# Patient Record
Sex: FEMALE | Race: ASIAN | Employment: OTHER | URBAN - METROPOLITAN AREA
[De-identification: names, ages, dates, MRNs, and addresses within clinical notes are randomized per-mention and may not be internally consistent; named-entity substitution may affect disease eponyms.]

---
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
## 2022-01-10 ENCOUNTER — HOSPITAL ENCOUNTER (EMERGENCY)
Age: 22
Discharge: HOME | End: 2022-01-10
Attending: EMERGENCY MEDICINE
Payer: OTHER GOVERNMENT

## 2022-01-10 VITALS
RESPIRATION RATE: 19 BRPM | DIASTOLIC BLOOD PRESSURE: 79 MMHG | OXYGEN SATURATION: 99 % | TEMPERATURE: 98.2 F | HEART RATE: 92 BPM | SYSTOLIC BLOOD PRESSURE: 123 MMHG

## 2022-01-10 DIAGNOSIS — Z20.822 SUSPECTED COVID-19 VIRUS INFECTION: ICD-10-CM

## 2022-01-10 DIAGNOSIS — J06.9 ACUTE URI: Primary | ICD-10-CM

## 2022-01-10 LAB — SARS-COV-2, COV2: NORMAL

## 2022-01-10 PROCEDURE — 99282 EMERGENCY DEPT VISIT SF MDM: CPT

## 2022-01-10 PROCEDURE — U0003 INFECTIOUS AGENT DETECTION BY NUCLEIC ACID (DNA OR RNA); SEVERE ACUTE RESPIRATORY SYNDROME CORONAVIRUS 2 (SARS-COV-2) (CORONAVIRUS DISEASE [COVID-19]), AMPLIFIED PROBE TECHNIQUE, MAKING USE OF HIGH THROUGHPUT TECHNOLOGIES AS DESCRIBED BY CMS-2020-01-R: HCPCS

## 2022-01-10 RX ORDER — ONDANSETRON 4 MG/1
4 TABLET, ORALLY DISINTEGRATING ORAL
Qty: 12 TABLET | Refills: 0 | Status: SHIPPED | OUTPATIENT
Start: 2022-01-10

## 2022-01-10 NOTE — ED PROVIDER NOTES
EMERGENCY DEPARTMENT HISTORY AND PHYSICAL EXAM    Date: 1/10/2022  Patient Name: Willie Pretty    History of Presenting Illness     Chief Complaint   Patient presents with    Flu Like Symptoms         History Provided By: Patient    6:22 PM  Willie Pretty is a 25 y.o. female with PMHX of vaccinated for COVID-19 but has not yet received booster who presents to the emergency department C/O fever, cough, congestion, headache, loss of appetite. Patient reports symptoms started 3 days ago. No known sick contacts or recent travel. No clear relieving or exacerbating factors identified. Denies any chest pain, shortness of breath, vomiting, bowel or urinary complaints. PCP: Damir Cleaning MD    Current Outpatient Medications   Medication Sig Dispense Refill    ondansetron (ZOFRAN ODT) 4 mg disintegrating tablet Take 1 Tablet by mouth every eight (8) hours as needed for Nausea or Vomiting. 12 Tablet 0       Past History       Past Medical History:  No past medical history on file. Past Surgical History:  No past surgical history on file. Family History:  No family history on file. Social History:  Social History     Tobacco Use    Smoking status: Never Smoker    Smokeless tobacco: Never Used   Substance Use Topics    Alcohol use: Never    Drug use: Never       Allergies:  No Known Allergies      Review of Systems   Review of Systems   Constitutional: Positive for appetite change and fever. HENT: Positive for congestion. Respiratory: Positive for cough. Negative for shortness of breath. Cardiovascular: Negative for chest pain. Musculoskeletal: Positive for myalgias. Neurological: Positive for headaches. All other systems reviewed and are negative.         Physical Exam     Vitals:    01/10/22 1606   BP: 123/79   Pulse: 92   Resp: 19   Temp: 98.2 °F (36.8 °C)   SpO2: 99%     Physical Exam    Nursing notes and vital signs reviewed    Constitutional: Non toxic appearing, moderate distress  Head: Normocephalic, Atraumatic  Eyes: EOMI  Neck: Supple  Cardiovascular: Regular rate and rhythm, no murmurs, rubs, or gallops  Chest: Normal work of breathing and chest excursion bilaterally  Lungs: Clear to ausculation bilaterally  Back: No evidence of trauma or deformity  Extremities: No evidence of trauma or deformity  Skin: Warm and dry, normal cap refill  Neuro: Alert and appropriate  Psychiatric: Normal mood and affect      Diagnostic Study Results     Labs -   No results found for this or any previous visit (from the past 12 hour(s)). Radiologic Studies -   No orders to display     CT Results  (Last 48 hours)    None        CXR Results  (Last 48 hours)    None          Medications given in the ED-  Medications - No data to display      Medical Decision Making   I am the first provider for this patient. I reviewed the vital signs, available nursing notes, past medical history, past surgical history, family history and social history. Vital Signs-Reviewed the patient's vital signs. Pulse Oximetry Analysis - 99% on room air, not hypoxic     Records Reviewed: Nursing Notes    Provider Notes (Medical Decision Making): Anastasia Barbour is a 25 y.o. female present with history and exam consistent with acute upper respiratory infection in setting of hiking prevalence of COVID-19. Patient is hemodynamically stable without evidence of respiratory distress. Not hypoxic on room air. Covid-19 testing was sent and is pending. Plan for discharge with strict quarantine instructions and return precautions. Patient understands and agrees with this plan. Procedures:  Procedures    ED Course:       Diagnosis and Disposition     Critical Care: None    DISCHARGE NOTE:    Nicholas Hansen's  results have been reviewed with her. She has been counseled regarding her diagnosis, treatment, and plan.   She verbally conveys understanding and agreement of the signs, symptoms, diagnosis, treatment and prognosis and additionally agrees to follow up as discussed. She also agrees with the care-plan and conveys that all of her questions have been answered. I have also provided discharge instructions for her that include: educational information regarding their diagnosis and treatment, and list of reasons why they would want to return to the ED prior to their follow-up appointment, should her condition change. She has been provided with education for proper emergency department utilization. CLINICAL IMPRESSION:    1. Acute URI    2. Suspected COVID-19 virus infection        PLAN:  1. D/C Home  2. Current Discharge Medication List      START taking these medications    Details   ondansetron (ZOFRAN ODT) 4 mg disintegrating tablet Take 1 Tablet by mouth every eight (8) hours as needed for Nausea or Vomiting. Qty: 12 Tablet, Refills: 0  Start date: 1/10/2022           3. Follow-up Information     Follow up With Specialties Details Why [address] [facility]  Schedule an appointment as soon as possible for a visit   [address] [place] [address]  [phone]    THE [facility] EMERGENCY DEPT Emergency Medicine  If symptoms worsen [address] [place] [address]  [phone]        _______________________________      Please note that this dictation was completed with [employer], the computer voice recognition software. Quite often unanticipated grammatical, syntax, homophones, and other interpretive errors are inadvertently transcribed by the computer software. Please disregard these errors. Please excuse any errors that have escaped final proofreading.

## 2022-01-10 NOTE — Clinical Note
[facility]  THE [facility] EMERGENCY DEPT  [address]  [facility] NEWS [facility] [address] [phone]    Work/School Note    Date: 1/10/2022     To Whom It May concern:    James Gandara was evaluated by the following provider(s):  Attending Provider: Cristiano Gilliland [address] [place] [address] virus is suspected. Per the CDC guidelines we recommend home isolation until the following conditions are all met:    1. At least five days have passed since symptoms first appeared and/or had a close exposure,   2. After home isolation for five days, wearing a mask around others for the next five days,  3. At least 24 have passed since last fever without the use of fever-reducing medications and  4.  Symptoms (eg cough, shortness of breath) have improved      Sincerely,          Mason Haque MD

## 2022-01-10 NOTE — Clinical Note
[facility]  [facility]  [address]  [facility] [address] [phone]    Work/School Note    Date: 1/10/2022     To Whom It May concern:    Jen Pandya was evaluated by the following provider(s):  Attending Provider: Shereen Marti [address] virus is suspected. Per the CDC guidelines we recommend home isolation until the following conditions are all met:    1. At least five days have passed since symptoms first appeared and/or had a close exposure,   2. After home isolation for five days, wearing a mask around others for the next five days,  3. At least 24 have passed since last fever without the use of fever-reducing medications and  4.  Symptoms (eg cough, shortness of breath) have improved      Sincerely,          Dakota Bailey MD

## 2022-01-11 ENCOUNTER — PATIENT OUTREACH (OUTPATIENT)
Dept: CASE MANAGEMENT | Age: 22
End: 2022-01-11

## 2022-01-11 NOTE — PROGRESS NOTES
Patient contacted regarding COVID-19. Discussed COVID-19 related testing which was pending at this time. Test results were pending. Patient informed of results, if available? n/a     Ambulatory Care Manager contacted the patient by telephone to perform post discharge assessment. Call within 2 business days of discharge: Yes Verified name and  with patient as identifiers. Provided introduction to self, and explanation of the CTN/ACM role, and reason for call due to risk factors for infection and/or exposure to COVID-19. Symptoms reviewed with patient who verbalized the following symptoms: fever, pain or aching joints, cough, no new symptoms, no worsening symptoms and h/a and congestion      Due to no new or worsening symptoms encounter was not routed to provider for escalation. Discussed follow-up appointments. If no appointment was previously scheduled, appointment scheduling offered:  [facility] follow up appointment(s): No future appointments. Non-[facility] follow up appointment(s): Pt to f/u w/ her [address]:   Does patient have an Advance Directive:  not on file. Patient has following risk factors of: no known risk factors. ACM reviewed discharge instructions, medical action plan and red flags such as increased shortness of breath, increasing fever and signs of decompensation with patient who verbalized understanding. Discussed exposure protocols and quarantine with CDC Guidelines What to do if you are sick with coronavirus disease .  patient was given an opportunity for questions and concerns. The patient agrees to contact the [facility] exposure line [phone], [facility]  ([phone] and PCP office for questions related to their healthcare. ACM provided contact information for future needs.     Reviewed and educated patient on any new and changed medications related to discharge diagnosis     Was patient discharged with a pulse oximeter? no Discussed and confirmed pulse oximeter discharge instructions and when to notify provider or seek emergency care. Plan for follow-up call in 5-7 days based on severity of symptoms and risk factors.

## 2022-01-12 LAB — SARS-COV-2, NAA: DETECTED

## 2022-01-18 ENCOUNTER — PATIENT OUTREACH (OUTPATIENT)
Dept: CASE MANAGEMENT | Age: 22
End: 2022-01-18

## 2022-01-18 NOTE — PROGRESS NOTES
Follow Up Call    Challenges to be reviewed by the provider   Additional needs identified to be addressed with provider: no  none           Encounter was not routed to provider for escalation. Method of communication with provider: none. Contacted the patient by telephone to follow up after ED. Status: significantly improved  Interventions to address identified needs: Reviewed and followed up on pending diagnostic tests and treatments-      [address] follow up appointment(s): No future appointments. Non-[facility] follow up appointment(s): none   Follow up appointment completed? Pt will sched w/ her PCP. Provided contact information for future needs. Plan for follow-up call in 7-10 days based on severity of symptoms and risk factors.   Plan for next call: follow up appointment-      Dorothea Stahl RN

## 2022-01-25 ENCOUNTER — PATIENT OUTREACH (OUTPATIENT)
Dept: CASE MANAGEMENT | Age: 22
End: 2022-01-25

## 2022-01-25 NOTE — PROGRESS NOTES
Patient resolved from [address] Transitions episode on 1/25/22. Patient/family has been provided the following resources and education related to COVID-19:                         Signs, symptoms and red flags related to COVID-19            CDC exposure and quarantine guidelines            [facility] exposure contact - [phone]            Contact for their local Department of Health                 Patient currently reports that their sx have improved or resolved. No further outreach scheduled with this CTN/ACM. Episode of Care resolved. Patient has this CTN/ACM contact information if future needs arise.

## 2022-03-19 PROBLEM — S50.12XA CONTUSION OF LEFT FOREARM: Status: ACTIVE | Noted: 2021-10-12

## 2022-12-11 ENCOUNTER — APPOINTMENT (OUTPATIENT)
Dept: GENERAL RADIOLOGY | Age: 22
End: 2022-12-11
Attending: EMERGENCY MEDICINE
Payer: OTHER GOVERNMENT

## 2022-12-11 ENCOUNTER — HOSPITAL ENCOUNTER (EMERGENCY)
Age: 22
Discharge: HOME | End: 2022-12-11
Attending: EMERGENCY MEDICINE
Payer: OTHER GOVERNMENT

## 2022-12-11 VITALS
DIASTOLIC BLOOD PRESSURE: 54 MMHG | HEIGHT: 60 IN | TEMPERATURE: 97.8 F | OXYGEN SATURATION: 99 % | RESPIRATION RATE: 18 BRPM | SYSTOLIC BLOOD PRESSURE: 110 MMHG | WEIGHT: 110 LBS | BODY MASS INDEX: 21.6 KG/M2 | HEART RATE: 70 BPM

## 2022-12-11 DIAGNOSIS — S60.221A CONTUSION OF RIGHT HAND, INITIAL ENCOUNTER: Primary | ICD-10-CM

## 2022-12-11 PROCEDURE — 73130 X-RAY EXAM OF HAND: CPT

## 2022-12-11 PROCEDURE — 99283 EMERGENCY DEPT VISIT LOW MDM: CPT

## 2022-12-11 NOTE — Clinical Note
Leo Houston was seen and treated in our emergency department on 12/11/2022. Patient will be unable to use the right hand until 12/13 and her activities should be modified to accommodate this.     Abhijeet Calvert, DO

## 2022-12-11 NOTE — ED PROVIDER NOTES
EMERGENCY DEPARTMENT HISTORY AND PHYSICAL EXAM      Date: 12/11/2022  Patient Name: Glenny May      History of Presenting Illness     Chief Complaint   Patient presents with    Hand Injury       History Provided By: Patient  Location/Duration/Severity/Modifying factors   82-year-old female present with right hand pain after she states that it got smashed in a [place] door. She states that this happened just prior to arrival sustained an abrasion to the top of the right hand. Worsens when she tries to make a fist.  She is able to move all of her fingers pain isolated to the dorsum of the hand as well as some aspect the palmar surface along the third and fourth metacarpals. Denies any possibility of pregnancy, denies any medical problems no other injuries. No numbness or tingling. Hand Injury   Pertinent negatives include no back pain and no neck pain. There are no other complaints, changes, or physical findings at this time. PCP: None    Current Outpatient Medications   Medication Sig Dispense Refill    ondansetron (ZOFRAN ODT) 4 mg disintegrating tablet Take 1 Tablet by mouth every eight (8) hours as needed for Nausea or Vomiting. 12 Tablet 0       Past History     Past Medical History:  History reviewed. No pertinent past medical history. Past Surgical History:  History reviewed. No pertinent surgical history. Family History:  History reviewed. No pertinent family history. Social History:  Social History     Tobacco Use    Smoking status: Never    Smokeless tobacco: Never   Substance Use Topics    Alcohol use: Never    Drug use: Never       Allergies:  No Known Allergies      Review of Systems     Review of Systems   Constitutional:  Negative for fever. HENT:  Negative for congestion. Respiratory:  Negative for shortness of breath. Cardiovascular:  Negative for chest pain. Genitourinary:  Negative for difficulty urinating. Musculoskeletal:  Positive for arthralgias.  Negative for back pain and neck pain. Physical Exam     Physical Exam  Constitutional:       Appearance: Normal appearance. HENT:      Head: Normocephalic and atraumatic. Nose: Nose normal.   Eyes:      Conjunctiva/sclera: Conjunctivae normal.   Cardiovascular:      Pulses: Normal pulses. Heart sounds: No murmur heard. Pulmonary:      Effort: Pulmonary effort is normal. No respiratory distress. Abdominal:      General: Abdomen is flat. Musculoskeletal:         General: Swelling present. Normal range of motion. Cervical back: Neck supple. Comments: Superficial abrasion over approximately the fourth metacarpal.  There is mild soft tissue swelling. There is mild underlying tenderness. Able to flex and extend all fingers normal sensation. Median, radial and ulnar nerve distribution is preserved. Skin:     General: Skin is warm and dry. Findings: No rash. Neurological:      General: No focal deficit present. Mental Status: She is alert. Cranial Nerves: No cranial nerve deficit. Sensory: No sensory deficit. Motor: No weakness. Lab and Diagnostic Study Results     Labs -  No results found for this or any previous visit (from the past 24 hour(s)). Radiologic Studies -   XR HAND RT MIN 3 V    (Results Pending)         Medical Decision Making and ED Course   - I am the first and primary provider for this patient AND AM THE PRIMARY PROVIDER OF RECORD. - I reviewed the vital signs, available nursing notes, past medical history, past surgical history, family history and social history. - Initial assessment performed. The patients presenting problems have been discussed, and the staff are in agreement with the care plan formulated and outlined with them. I have encouraged them to ask questions as they arise throughout their visit. Vital Signs-Reviewed the patient's vital signs.     Patient Vitals for the past 24 hrs:   Temp Pulse Resp BP SpO2   12/11/22 0122 97.8 °F (36.6 °C) 70 18 (!) 110/54 99 %         Nursing notes and pertinent past medical history including imaging and labs have been reviewed (if available)    ED Course:     ED Course as of 12/11/22 0309   Sun Dec 11, 2022   0306 Interpreted by Garcia Carr DO  XRAY of the right hand reveals no fracture or dislocation, joint spaces preserved. No acute findings. [GOKUL]      ED Course User Index  [GOKUL] Jaylan Méndez DO       Provider Notes (Medical Decision Making):     MDM  Number of Diagnoses or Management Options  Diagnosis management comments: 29-year-old female with right hand pain after she smashed it in a door. DDX: Sprain, strain, soft tissue injury, contusion, fracture, dislocation, etc.    I reviewed her x-ray I see no evidence of a fracture. We will discharge the patient home, will provide with an Ace wrap and patient will take OTC analgesics continue ice and elevation. _________________________________________________________________    At this time, patient is stable and appropriate for discharge home. Patient demonstrates understanding of current diagnoses and is in agreement with the treatment plan. They are advised that while the likelihood of serious underlying condition is low at this point given the evaluation performed today, we cannot fully rule it out. They are advised to immediately return with any new symptoms or worsening of current condition. Any Incidental findings were noted on the patient's discharge paperwork as well as verbally directly to the patient, and the appropriate follow-up was given to the patient as far as instructions on testing needed as well as the timeframe. All questions have been answered. Patient is given educational material regarding their diagnoses, including danger symptoms and when to return to the ED. This note was dictated utilizing Dragon voice recognition software. Unfortunately this leads to occasional typographical errors.  I apologize in advance if the situation occurs. If questions occur please do not hesitate to contact me directly. Leida Ospina DO          Procedures and Critical Care   Performed by: Leida Ospina DO  Procedures         Leida Ospina DO      Diagnosis:   1. Contusion of right hand, initial encounter          Disposition: Discharge    Follow-up Information       Follow up With Specialties Details Why [address]    Your Primary Care Physician  In 3 days              Patient's Medications   Start Taking    No medications on file   Continue Taking    ONDANSETRON (ZOFRAN ODT) 4 MG DISINTEGRATING TABLET    Take 1 Tablet by mouth every eight (8) hours as needed for Nausea or Vomiting. These Medications have changed    No medications on file   Stop Taking    No medications on file       Patient seen in the context of the Novel Coronavirus (COVID19) pandemic, utilizing contemporary protocols and evidence based on the most up to date available evidence, understanding that the current evidence has the potential to change as additional information becomes available. This note is dictated utilizing Dragon voice recognition software. Unfortunately this leads to occasional typographical errors using the voice recognition. I apologize in advance if the situation occurs. If questions occur please do not hesitate to contact me directly.     Leida Ospina DO

## 2023-01-19 ENCOUNTER — HOSPITAL ENCOUNTER (EMERGENCY)
Age: 23
Discharge: HOME | End: 2023-01-19
Attending: EMERGENCY MEDICINE
Payer: OTHER GOVERNMENT

## 2023-01-19 VITALS
SYSTOLIC BLOOD PRESSURE: 112 MMHG | TEMPERATURE: 97.1 F | BODY MASS INDEX: 19.83 KG/M2 | DIASTOLIC BLOOD PRESSURE: 67 MMHG | OXYGEN SATURATION: 100 % | HEIGHT: 60 IN | RESPIRATION RATE: 16 BRPM | HEART RATE: 81 BPM | WEIGHT: 101 LBS

## 2023-01-19 DIAGNOSIS — J11.1 INFLUENZA-LIKE ILLNESS: Primary | ICD-10-CM

## 2023-01-19 LAB
FLUAV RNA SPEC QL NAA+PROBE: NOT DETECTED
FLUBV RNA SPEC QL NAA+PROBE: NOT DETECTED
S PYO AG THROAT QL: NEGATIVE
SARS-COV-2, COV2: NOT DETECTED

## 2023-01-19 PROCEDURE — 87070 CULTURE OTHR SPECIMN AEROBIC: CPT

## 2023-01-19 PROCEDURE — 87636 SARSCOV2 & INF A&B AMP PRB: CPT

## 2023-01-19 PROCEDURE — 87147 CULTURE TYPE IMMUNOLOGIC: CPT

## 2023-01-19 PROCEDURE — 99283 EMERGENCY DEPT VISIT LOW MDM: CPT

## 2023-01-19 PROCEDURE — 87880 STREP A ASSAY W/OPTIC: CPT

## 2023-01-19 RX ORDER — IBUPROFEN 600 MG/1
600 TABLET ORAL
Qty: 20 TABLET | Refills: 0 | Status: SHIPPED | OUTPATIENT
Start: 2023-01-19

## 2023-01-19 NOTE — ED TRIAGE NOTES
Patient ambulatory to ED c/o sore throat, generalized aches starting yesterday     Patient endorses sick contacts.

## 2023-01-19 NOTE — DISCHARGE INSTRUCTIONS
Your rapid strep is negative. Throat culture is pending. We will call with antibiotic needed. COVID and flu tests are pending. Check MyChart in a few hours for results. We do not call with results. If positive result, print and present to your [place] for stay at home requirements. Your symptoms today are suggestive of a viral illness. This could be COVID or flu. You are contagious.   Motrin, 600 mg, 1 up to 4 times a day for pain and fever  Can also take Tylenol  Follow-up with your doctor in the next few days as needed  Return to ER if new or worsening symptoms or new concerns

## 2023-01-19 NOTE — Clinical Note
[facility] [place] [place]  [facility] EMERGENCY DEPT  [address]  [facility] [address] [phone]    Work/School Note    Date: 1/19/2023    To Whom It May concern:      Rollen Gilford was seen and treated today in the emergency room by the following provider(s):  Attending Provider: Lesly De Jesus MD  Physician Assistant: Cheral Crigler, PA-C. Rollen Gilford is excused from work/school on 01/19/23. She is clear to return to work/school on 01/20/23. COVID and flu tests are pending. Pt to check MyChart for results.  Pt aware if positive can print results for additional days off work (per her [place])    Sincerely,          Breanna Vyas PA-C

## 2023-01-19 NOTE — ED PROVIDER NOTES
EMERGENCY DEPARTMENT HISTORY & PHYSICAL EXAM    THE [facility] EMERGENCY DEPT  1/19/2023, 8:52 AM    Clinical Impression:  1. Influenza-like illness        Assessment/Differential Diagnosis:     Ddx to, COVID, strep, viral infection, bacterial infection all considered    ED Course:   Initial assessment performed. The patients presenting problems have been discussed, and they are in agreement with the care plan formulated and outlined with them. I have encouraged them to ask questions as they arise throughout their visit. Pt here with Day 2 of ST, fever (101 yesterday), myalgia. NO rhinorrhea, cough, N/V/D or abd pain. No rash, joint pain. Exam with pt appearing comfortable, throat red without exudate. Uvula midline. Neck supple, no meningismus. Rapid strep neg. COVID/FLU pending, pt aware to check MyChart later today for results. Contagious precautions, symptomatic care and return precautions given. Medical Chart Review:  I have reviewed triage nursing documentation. Review of old medical records with the following pertinent information:       Disposition:  Home  in good condition. Chief Complaint   Patient presents with    Flu Like Symptoms     HPI:    The history is provided by patient. No  used. Rollen Gilford is a 23 y.o. female presenting to the Emergency Department with complaints of day 2 of fever and sore throat. Patient states she was exposed to somebody with a fever at work earlier this week. She is unsure of his diagnosis. Patient states she had fever of 101 yesterday. No medications taken today. She continues with sore throat. No rhinorrhea, cough, nausea, vomiting, diarrhea, rash or joint pain. No other concerns. She denies any chronic medical problems and takes no chronic medications. Last menstrual cycle began last week, was normal, denies pregnancy.       I have reviewed all PMHX, FMHX and Social Hx as entered into the medical record in the chart below using the Epic Template. Review of Systems:  Constitutional:+ for fever, chills. ENT:  positive for sore throat, neg rhinorrhea, negative for ear pain  Respiratory:  neg for cough, no shortness of breath  Cardiovascular:  neg for chest pain  GI:  neg for abdominal pain. No n/v/d.  :  No dysuria, hematuria. No Flank pain. MSK: negative for arthralgias, positive for myalgias  Integumentary: no rashes, or skin trauma  Neurological: neg for headaches  All other systems reviewed negative with exception of positives in ROS and HPI. Past Medical History:  No past medical history on file. Past Surgical History:  No past surgical history on file. Family History:  No family history on file. Social History:  Social History     Tobacco Use    Smoking status: Never    Smokeless tobacco: Never   Substance Use Topics    Alcohol use: Never    Drug use: Never       Allergies:  No Known Allergies    Vital Signs:  Vitals:    01/19/23 0849   BP: 112/67   Pulse: 81   Resp: 16   Temp: 97.1 °F (36.2 °C)   SpO2: 100%   Weight: 45.8 kg (101 lb)   Height: 5' (1.524 m)     Physical Exam:  Vital Signs Reviewed. Nursing Notes Reviewed. Constitutional:  Well developed, well nourished patient. Appearance and behavior are age and situation appropriate. Ambulating normally. Nontoxic appearing. Head: Normocephalic, Atraumatic . No facial swelling . No rash. No pain over sinuses with percussion. Eyes: Visual acuity grossly normal by my exam. Conjunctiva clear,Sclera anicteric. PERRLA. Eyelids normal   ENT:hearing grossly intact, Canals normal, TMs normal. Nose patent, normal septum,no congestion present. Throat slt erythema. No lesions of oral mucosa. Neck:  supple, FROM , no nuchal rigidity. No adenopathy. No swelling   Lungs: No respiratory distress. Lungs CTAB . No cough on exam.  CV:  RR&R without murmur. Neuro:  A&O, no obvious neuro deficit.    Skin:  Warm, dry, no rash.    Diagnostics:    Labs -     Recent Results (from the past 12 hour(s))   COVID-19 WITH INFLUENZA A/B    Collection Time: 01/19/23  8:51 AM   Result Value Ref Range    SARS-CoV-2 by PCR Not detected NOTD      Influenza A by PCR Not detected NOTD      Influenza B by PCR Not detected NOTD     POC GROUP A STREP    Collection Time: 01/19/23  9:00 AM   Result Value Ref Range    Group A strep (POC) Negative NEG         Radiologic Studies -   No orders to display     CT Results  (Last 48 hours)      None          CXR Results  (Last 48 hours)      None            Medications given in the ED-  Medications - No data to display    Please note that this dictation was completed with [employer], the computer voice recognition software. Quite often unanticipated grammatical, syntax, homophones, and other interpretive errors are inadvertently transcribed by the computer software. Please disregard these errors. Please excuse any errors that have escaped final proofreading.

## 2023-01-21 LAB
BACTERIA SPEC CULT: ABNORMAL
BACTERIA SPEC CULT: ABNORMAL
SERVICE CMNT-IMP: ABNORMAL

## 2023-01-21 RX ORDER — AMOXICILLIN 500 MG/1
500 TABLET, FILM COATED ORAL 2 TIMES DAILY
Qty: 20 TABLET | Refills: 0 | Status: SHIPPED | OUTPATIENT
Start: 2023-01-21 | End: 2023-01-31

## 2023-01-21 NOTE — CALL BACK NOTE
Throat culture positive for strep. Called and spoke to patient. Prescription for amoxicillin sent to her [address]

## 2023-03-31 NOTE — ED PROVIDER NOTES
"EMERGENCY DEPARTMENT HISTORY AND PHYSICAL EXAM    Date: 10/12/2021  Patient Name: Catherine Elias    History of Presenting Illness     Chief Complaint   Patient presents with    Arm Injury         History Provided By: Patient    Catherine Elias is a 25 y.o. female who presents to the emergency department C/O left forearm injury. Patient states it happened just prior to arrival.  States she was running around with one of her friends of the varix and the door was accidentally slammed on her mid left forearm region. She states the pain is worse with movement. States the arm feels better when it is lifted. Denies any wrist or elbow pain. Denies any open wound        PCP: Damir Cleaning MD    Current Outpatient Medications   Medication Sig Dispense Refill    naproxen (NAPROSYN) 500 mg tablet Take 1 Tablet by mouth two (2) times daily (with meals) for 14 days. 28 Tablet 0       Past History     Past Medical History:  History reviewed. No pertinent past medical history. Past Surgical History:  History reviewed. No pertinent surgical history. Family History:  History reviewed. No pertinent family history. Social History:  Social History     Tobacco Use    Smoking status: Never Smoker    Smokeless tobacco: Never Used   Substance Use Topics    Alcohol use: Never    Drug use: Never       Allergies:  No Known Allergies      Review of Systems   Review of Systems   Constitutional: Negative for fever. Respiratory: Negative for shortness of breath. Cardiovascular: Negative for chest pain. Gastrointestinal: Negative for abdominal pain. Musculoskeletal: Positive for arthralgias and myalgias. Negative for back pain and joint swelling. Neurological: Negative for dizziness, syncope, weakness and headaches. All other systems reviewed and are negative. All other systems reviewed and are negative.     Physical Exam     Vitals:    10/12/21 0035 10/12/21 0128   BP: 119/72 117/61   Pulse: 66 60   Resp: 16 14   Temp: "large is it?\" (Inches or centimeters)       *No Answer*  8. PAIN: \"Is there any pain?\" If so, ask: \"How bad is it?\"       *No Answer*  9. TETANUS: For any breaks in the skin, ask: \"When was the last tetanus booster?\"      *No Answer*    Protocols used: HEAD INJURY-P-OH      " 97.7 °F (36.5 °C)    SpO2: 97% 100%   Weight: 45.4 kg (100 lb)    Height: 5' (1.524 m)      Physical Exam    Nursing notes and vital signs reviewed    Constitutional: Non toxic appearing, no acute distress, appearing stated age  Eyes: PERRL, EOMI, No conjunctival injection  ENT: external ears normal, No rhinorrhea, external nose normal, mucous membranes moist  Cardiovascular: Regular rate and rhythm, no murmurs, No JVD  Respiratory: Clear to ausculation bilaterally, No stridor, Normal work of breathing and chest excursion bilaterally  Abdomen: Soft, non tender, non distended, normoactive bowel sounds, No rigidity, no peritoneal signs  Musculoskeletal:  No evidence of obvious injury to Head, Neck, Back, Extremities, no LE edema  Skin: Warm, dry, No obvious rashes  Neuro: Alert and oriented x 3, CN 2-12 intact, normal speech, strength and sensation full and symmetric bilaterally  Psychiatric: Normal mood and affect      Diagnostic Study Results     Labs -   No results found for this or any previous visit (from the past 72 hour(s)). Radiologic Studies -   XR FOREARM LT AP/LAT    (Results Pending)     CT Results  (Last 48 hours)    None        CXR Results  (Last 48 hours)    None          Medications given in the ED-  Medications   acetaminophen (TYLENOL) tablet 1,000 mg (1,000 mg Oral Given 10/12/21 0048)   ibuprofen (MOTRIN) tablet 800 mg (800 mg Oral Given 10/12/21 0048)         Medical Decision Making     I reviewed the vital signs, available nursing notes, past medical history, past surgical history, family history and social history. Vital Signs interpretation- I have reviewed the patient's vital signs.     Pulse Oximetry interpretation - 97% on Room air     Cardiac Monitor interpretation:  Rate: 66 bpm  Rhythm: sinus       Records Reviewed: Nursing Notes and Old Medical Records    Procedures:  Procedures    ED Course and MDM:    X-ray shows no evidence of bony process    Patient was given left arm sling ice ibuprofen and NSAIDs. Recommended continue with rice therapy and NSAIDs      Diagnosis and Disposition         DISCHARGE NOTE:    Tala Hansen's  results have been reviewed with her. She has been counseled regarding her diagnosis, treatment, and plan. She verbally conveys understanding and agreement of the signs, symptoms, diagnosis, treatment and prognosis and additionally agrees to follow up as discussed. She also agrees with the care-plan and conveys that all of her questions have been answered. I have also provided discharge instructions for her that include: educational information regarding their diagnosis and treatment, and list of reasons why they would want to return to the ED prior to their follow-up appointment, should her condition change. She has been provided with education for proper emergency department utilization. CLINICAL IMPRESSION:    1. Contusion of left forearm, initial encounter        PLAN:  1. D/C Home  2. Discharge Medication List as of 10/12/2021  1:25 AM        3. Follow-up Information     Follow up With Specialties Details Why Contact Saranya STRAUSS  Schedule an appointment as soon as possible for a visit   [phone]        _______________________________      Please note that this dictation was completed with [employer], the computer voice recognition software. Quite often unanticipated grammatical, syntax, homophones, and other interpretive errors are inadvertently transcribed by the computer software. Please disregard these errors. Please excuse any errors that have escaped final proofreading.

## 2023-06-30 ENCOUNTER — HOSPITAL ENCOUNTER (EMERGENCY)
Facility: HOSPITAL | Age: 23
Discharge: HOME | End: 2023-06-30
Payer: OTHER GOVERNMENT

## 2023-06-30 VITALS
BODY MASS INDEX: 20.22 KG/M2 | DIASTOLIC BLOOD PRESSURE: 85 MMHG | TEMPERATURE: 98.4 F | RESPIRATION RATE: 18 BRPM | WEIGHT: 103 LBS | SYSTOLIC BLOOD PRESSURE: 114 MMHG | OXYGEN SATURATION: 98 % | HEART RATE: 77 BPM | HEIGHT: 60 IN

## 2023-06-30 DIAGNOSIS — N76.0 BACTERIAL VAGINOSIS: Primary | ICD-10-CM

## 2023-06-30 DIAGNOSIS — Z20.2 STD EXPOSURE: ICD-10-CM

## 2023-06-30 DIAGNOSIS — B96.89 BACTERIAL VAGINOSIS: Primary | ICD-10-CM

## 2023-06-30 LAB
APPEARANCE UR: CLEAR
BACTERIA URNS QL MICRO: ABNORMAL /HPF
BILIRUB UR QL: NEGATIVE
COLOR UR: YELLOW
EPITH CASTS URNS QL MICRO: ABNORMAL /LPF (ref 0–5)
GLUCOSE UR STRIP.AUTO-MCNC: NEGATIVE MG/DL
HCG UR QL: NEGATIVE
HGB UR QL STRIP: NEGATIVE
KETONES UR QL STRIP.AUTO: ABNORMAL MG/DL
LEUKOCYTE ESTERASE UR QL STRIP.AUTO: ABNORMAL
MUCOUS THREADS URNS QL MICRO: ABNORMAL /LPF
NITRITE UR QL STRIP.AUTO: NEGATIVE
PH UR STRIP: 6 (ref 5–8)
PROT UR STRIP-MCNC: ABNORMAL MG/DL
RBC #/AREA URNS HPF: ABNORMAL /HPF (ref 0–5)
SERVICE CMNT-IMP: NORMAL
SP GR UR REFRACTOMETRY: 1.03 (ref 1–1.03)
UROBILINOGEN UR QL STRIP.AUTO: 1 EU/DL (ref 0.2–1)
WBC URNS QL MICRO: ABNORMAL /HPF (ref 0–5)
WET PREP GENITAL: NORMAL
WET PREP GENITAL: NORMAL

## 2023-06-30 PROCEDURE — 81001 URINALYSIS AUTO W/SCOPE: CPT

## 2023-06-30 PROCEDURE — 87591 N.GONORRHOEAE DNA AMP PROB: CPT

## 2023-06-30 PROCEDURE — 2500000003 HC RX 250 WO HCPCS: Performed by: PHYSICIAN ASSISTANT

## 2023-06-30 PROCEDURE — 6370000000 HC RX 637 (ALT 250 FOR IP): Performed by: PHYSICIAN ASSISTANT

## 2023-06-30 PROCEDURE — 96372 THER/PROPH/DIAG INJ SC/IM: CPT

## 2023-06-30 PROCEDURE — 87491 CHLMYD TRACH DNA AMP PROBE: CPT

## 2023-06-30 PROCEDURE — 87210 SMEAR WET MOUNT SALINE/INK: CPT

## 2023-06-30 PROCEDURE — 99284 EMERGENCY DEPT VISIT MOD MDM: CPT

## 2023-06-30 PROCEDURE — 81025 URINE PREGNANCY TEST: CPT

## 2023-06-30 PROCEDURE — 6360000002 HC RX W HCPCS: Performed by: PHYSICIAN ASSISTANT

## 2023-06-30 RX ORDER — DOXYCYCLINE HYCLATE 100 MG
100 TABLET ORAL 2 TIMES DAILY
Qty: 20 TABLET | Refills: 0 | Status: SHIPPED | OUTPATIENT
Start: 2023-06-30 | End: 2023-07-10

## 2023-06-30 RX ORDER — DOXYCYCLINE 100 MG/1
100 CAPSULE ORAL
Status: COMPLETED | OUTPATIENT
Start: 2023-06-30 | End: 2023-06-30

## 2023-06-30 RX ORDER — METRONIDAZOLE 500 MG/1
500 TABLET ORAL 2 TIMES DAILY
Qty: 20 TABLET | Refills: 0 | Status: SHIPPED | OUTPATIENT
Start: 2023-06-30 | End: 2023-07-10

## 2023-06-30 RX ORDER — METRONIDAZOLE 250 MG/1
500 TABLET ORAL
Status: COMPLETED | OUTPATIENT
Start: 2023-06-30 | End: 2023-06-30

## 2023-06-30 RX ADMIN — METRONIDAZOLE 500 MG: 250 TABLET ORAL at 12:23

## 2023-06-30 RX ADMIN — DOXYCYCLINE 100 MG: 100 CAPSULE ORAL at 12:23

## 2023-06-30 RX ADMIN — LIDOCAINE HYDROCHLORIDE 500 MG: 10 INJECTION, SOLUTION EPIDURAL; INFILTRATION; INTRACAUDAL; PERINEURAL at 12:23

## 2023-07-03 LAB
C TRACH RRNA SPEC QL NAA+PROBE: POSITIVE
N GONORRHOEA RRNA SPEC QL NAA+PROBE: NEGATIVE
SPECIMEN SOURCE: ABNORMAL

## 2023-07-04 NOTE — RESULT ENCOUNTER NOTE
Culture positive for chlamydia. Called and notified patient. She was already empirically treated has taken doxycycline as prescribed. All questions answered safe sex practices and need to notify partners discussed.

## 2023-07-14 ENCOUNTER — HOSPITAL ENCOUNTER (EMERGENCY)
Facility: HOSPITAL | Age: 23
Discharge: HOME | End: 2023-07-15
Attending: EMERGENCY MEDICINE
Payer: OTHER GOVERNMENT

## 2023-07-14 VITALS
SYSTOLIC BLOOD PRESSURE: 100 MMHG | WEIGHT: 102 LBS | BODY MASS INDEX: 19.92 KG/M2 | OXYGEN SATURATION: 98 % | HEART RATE: 65 BPM | TEMPERATURE: 97.9 F | RESPIRATION RATE: 18 BRPM | DIASTOLIC BLOOD PRESSURE: 63 MMHG

## 2023-07-14 DIAGNOSIS — L23.7 POISON IVY DERMATITIS: ICD-10-CM

## 2023-07-14 DIAGNOSIS — R21 RASH AND OTHER NONSPECIFIC SKIN ERUPTION: Primary | ICD-10-CM

## 2023-07-14 DIAGNOSIS — L24.9 IRRITANT CONTACT DERMATITIS, UNSPECIFIED TRIGGER: ICD-10-CM

## 2023-07-14 PROCEDURE — 6370000000 HC RX 637 (ALT 250 FOR IP): Performed by: EMERGENCY MEDICINE

## 2023-07-14 PROCEDURE — 99283 EMERGENCY DEPT VISIT LOW MDM: CPT

## 2023-07-14 RX ORDER — PREDNISONE 20 MG/1
40 TABLET ORAL
Status: COMPLETED | OUTPATIENT
Start: 2023-07-14 | End: 2023-07-14

## 2023-07-14 RX ORDER — FLUOCINONIDE 0.5 MG/G
OINTMENT TOPICAL
Status: COMPLETED | OUTPATIENT
Start: 2023-07-14 | End: 2023-07-14

## 2023-07-14 RX ADMIN — FLUOCINONIDE: 0.5 OINTMENT TOPICAL at 23:31

## 2023-07-14 RX ADMIN — PREDNISONE 40 MG: 20 TABLET ORAL at 23:31

## 2023-07-14 ASSESSMENT — PAIN - FUNCTIONAL ASSESSMENT: PAIN_FUNCTIONAL_ASSESSMENT: NONE - DENIES PAIN

## 2023-07-15 RX ORDER — PREDNISONE 20 MG/1
20 TABLET ORAL 2 TIMES DAILY
Qty: 10 TABLET | Refills: 0 | Status: SHIPPED | OUTPATIENT
Start: 2023-07-15 | End: 2023-07-20

## 2023-07-16 ENCOUNTER — HOSPITAL ENCOUNTER (EMERGENCY)
Facility: HOSPITAL | Age: 23
Discharge: PSYCHIATRIC OR SUBSTANCE USE FACILITY | End: 2023-07-16
Payer: OTHER GOVERNMENT

## 2023-07-16 VITALS
HEART RATE: 73 BPM | RESPIRATION RATE: 18 BRPM | SYSTOLIC BLOOD PRESSURE: 116 MMHG | TEMPERATURE: 98.2 F | OXYGEN SATURATION: 100 % | WEIGHT: 102 LBS | BODY MASS INDEX: 20.03 KG/M2 | DIASTOLIC BLOOD PRESSURE: 57 MMHG | HEIGHT: 60 IN

## 2023-07-16 DIAGNOSIS — R45.851 SUICIDAL IDEATION: ICD-10-CM

## 2023-07-16 DIAGNOSIS — F41.9 ANXIETY: ICD-10-CM

## 2023-07-16 DIAGNOSIS — F33.2 SEVERE EPISODE OF RECURRENT MAJOR DEPRESSIVE DISORDER, WITHOUT PSYCHOTIC FEATURES (HCC): Primary | ICD-10-CM

## 2023-07-16 LAB
ALBUMIN SERPL-MCNC: 4.2 G/DL (ref 3.4–5)
ALBUMIN/GLOB SERPL: 1.3 (ref 0.8–1.7)
ALP SERPL-CCNC: 55 U/L (ref 45–117)
ALT SERPL-CCNC: 22 U/L (ref 13–56)
AMPHET UR QL SCN: NEGATIVE
ANION GAP SERPL CALC-SCNC: 5 MMOL/L (ref 3–18)
APAP SERPL-MCNC: <2 UG/ML (ref 10–30)
APPEARANCE UR: CLEAR
AST SERPL-CCNC: 19 U/L (ref 10–38)
BACTERIA URNS QL MICRO: ABNORMAL /HPF
BARBITURATES UR QL SCN: NEGATIVE
BASOPHILS # BLD: 0 K/UL (ref 0–0.1)
BASOPHILS NFR BLD: 1 % (ref 0–2)
BENZODIAZ UR QL: NEGATIVE
BILIRUB SERPL-MCNC: 1 MG/DL (ref 0.2–1)
BILIRUB UR QL: NEGATIVE
BUN SERPL-MCNC: 13 MG/DL (ref 7–18)
BUN/CREAT SERPL: 15 (ref 12–20)
CALCIUM SERPL-MCNC: 8.8 MG/DL (ref 8.5–10.1)
CANNABINOIDS UR QL SCN: NEGATIVE
CHLORIDE SERPL-SCNC: 108 MMOL/L (ref 100–111)
CO2 SERPL-SCNC: 26 MMOL/L (ref 21–32)
COCAINE UR QL SCN: NEGATIVE
COLOR UR: YELLOW
CREAT SERPL-MCNC: 0.86 MG/DL (ref 0.6–1.3)
DIFFERENTIAL METHOD BLD: ABNORMAL
EOSINOPHIL # BLD: 0.4 K/UL (ref 0–0.4)
EOSINOPHIL NFR BLD: 5 % (ref 0–5)
EPITH CASTS URNS QL MICRO: ABNORMAL /LPF (ref 0–5)
ERYTHROCYTE [DISTWIDTH] IN BLOOD BY AUTOMATED COUNT: 15.8 % (ref 11.6–14.5)
ETHANOL SERPL-MCNC: <3 MG/DL (ref 0–3)
FLUAV RNA SPEC QL NAA+PROBE: NOT DETECTED
FLUBV RNA SPEC QL NAA+PROBE: NOT DETECTED
GLOBULIN SER CALC-MCNC: 3.3 G/DL (ref 2–4)
GLUCOSE SERPL-MCNC: 89 MG/DL (ref 74–99)
GLUCOSE UR STRIP.AUTO-MCNC: NEGATIVE MG/DL
HCG SERPL QL: NEGATIVE
HCT VFR BLD AUTO: 45 % (ref 35–45)
HGB BLD-MCNC: 14.6 G/DL (ref 12–16)
HGB UR QL STRIP: NEGATIVE
IMM GRANULOCYTES # BLD AUTO: 0 K/UL (ref 0–0.04)
IMM GRANULOCYTES NFR BLD AUTO: 0 % (ref 0–0.5)
KETONES UR QL STRIP.AUTO: 15 MG/DL
LEUKOCYTE ESTERASE UR QL STRIP.AUTO: ABNORMAL
LYMPHOCYTES # BLD: 1.5 K/UL (ref 0.9–3.6)
LYMPHOCYTES NFR BLD: 19 % (ref 21–52)
Lab: NORMAL
MCH RBC QN AUTO: 25.8 PG (ref 24–34)
MCHC RBC AUTO-ENTMCNC: 32.4 G/DL (ref 31–37)
MCV RBC AUTO: 79.5 FL (ref 78–100)
METHADONE UR QL: NEGATIVE
MONOCYTES # BLD: 0.5 K/UL (ref 0.05–1.2)
MONOCYTES NFR BLD: 5 % (ref 3–10)
MUCOUS THREADS URNS QL MICRO: ABNORMAL /LPF
NEUTS SEG # BLD: 5.8 K/UL (ref 1.8–8)
NEUTS SEG NFR BLD: 71 % (ref 40–73)
NITRITE UR QL STRIP.AUTO: NEGATIVE
NRBC # BLD: 0 K/UL (ref 0–0.01)
NRBC BLD-RTO: 0 PER 100 WBC
OPIATES UR QL: NEGATIVE
PCP UR QL: NEGATIVE
PH UR STRIP: 6 (ref 5–8)
PLATELET # BLD AUTO: 265 K/UL (ref 135–420)
PMV BLD AUTO: 10.3 FL (ref 9.2–11.8)
POTASSIUM SERPL-SCNC: 3.7 MMOL/L (ref 3.5–5.5)
PROT SERPL-MCNC: 7.5 G/DL (ref 6.4–8.2)
PROT UR STRIP-MCNC: NEGATIVE MG/DL
RBC # BLD AUTO: 5.66 M/UL (ref 4.2–5.3)
RBC #/AREA URNS HPF: ABNORMAL /HPF (ref 0–5)
SALICYLATES SERPL-MCNC: <1.7 MG/DL (ref 2.8–20)
SARS-COV-2 RNA RESP QL NAA+PROBE: NOT DETECTED
SODIUM SERPL-SCNC: 139 MMOL/L (ref 136–145)
SP GR UR REFRACTOMETRY: 1.02 (ref 1–1.03)
UROBILINOGEN UR QL STRIP.AUTO: 0.2 EU/DL (ref 0.2–1)
WBC # BLD AUTO: 8.3 K/UL (ref 4.6–13.2)
WBC URNS QL MICRO: ABNORMAL /HPF (ref 0–5)

## 2023-07-16 PROCEDURE — 80179 DRUG ASSAY SALICYLATE: CPT

## 2023-07-16 PROCEDURE — 87636 SARSCOV2 & INF A&B AMP PRB: CPT

## 2023-07-16 PROCEDURE — 85025 COMPLETE CBC W/AUTO DIFF WBC: CPT

## 2023-07-16 PROCEDURE — 81001 URINALYSIS AUTO W/SCOPE: CPT

## 2023-07-16 PROCEDURE — 80307 DRUG TEST PRSMV CHEM ANLYZR: CPT

## 2023-07-16 PROCEDURE — 80143 DRUG ASSAY ACETAMINOPHEN: CPT

## 2023-07-16 PROCEDURE — 82077 ASSAY SPEC XCP UR&BREATH IA: CPT

## 2023-07-16 PROCEDURE — 84703 CHORIONIC GONADOTROPIN ASSAY: CPT

## 2023-07-16 PROCEDURE — 99285 EMERGENCY DEPT VISIT HI MDM: CPT

## 2023-07-16 PROCEDURE — 80053 COMPREHEN METABOLIC PANEL: CPT

## 2023-10-10 ENCOUNTER — HOSPITAL ENCOUNTER (EMERGENCY)
Facility: HOSPITAL | Age: 23
Discharge: HOME | End: 2023-10-10
Payer: OTHER GOVERNMENT

## 2023-10-10 VITALS
TEMPERATURE: 97.7 F | OXYGEN SATURATION: 97 % | HEIGHT: 60 IN | DIASTOLIC BLOOD PRESSURE: 62 MMHG | RESPIRATION RATE: 18 BRPM | WEIGHT: 101 LBS | SYSTOLIC BLOOD PRESSURE: 105 MMHG | HEART RATE: 66 BPM | BODY MASS INDEX: 19.83 KG/M2

## 2023-10-10 DIAGNOSIS — J01.90 ACUTE SINUSITIS, RECURRENCE NOT SPECIFIED, UNSPECIFIED LOCATION: Primary | ICD-10-CM

## 2023-10-10 PROCEDURE — 99283 EMERGENCY DEPT VISIT LOW MDM: CPT

## 2023-10-10 RX ORDER — FLUTICASONE PROPIONATE 50 MCG
1 SPRAY, SUSPENSION (ML) NASAL DAILY
Qty: 32 G | Refills: 1 | Status: SHIPPED | OUTPATIENT
Start: 2023-10-10

## 2023-10-10 RX ORDER — LORATADINE 10 MG/1
10 TABLET ORAL DAILY
Qty: 20 TABLET | Refills: 0 | Status: SHIPPED | OUTPATIENT
Start: 2023-10-10 | End: 2023-10-30

## 2023-10-10 ASSESSMENT — PAIN - FUNCTIONAL ASSESSMENT: PAIN_FUNCTIONAL_ASSESSMENT: NONE - DENIES PAIN

## 2023-10-10 NOTE — ED PROVIDER NOTES
EMERGENCY DEPARTMENT HISTORY AND PHYSICAL EXAM      Patient Name: Dandy Garza  MRN: [id number]  YOB: 2000  Provider: MEGHAN Urena  PCP: None, None   Time/Date of evaluation: 3:04 PM EDT on 10/10/23    History of Presenting Illness     Chief Complaint   Patient presents with    Sinusitis       History Provided By: Patient     History Antonio [place]):   Dandy Garza is a 26 y.o. female with a PMHX of 10 weeks pregnant   who presents to the emergency department  by POV C/O sinus pressure and congestion for the past week. She has been taking Tylenol without relief. She is currently pregnant has not been seen by OB/GYN yet. No abdominal pain or vaginal bleeding. No fevers. States he has already been tested for COVID and flu was negative        Past History     Past Medical History:  No past medical history on file. Past Surgical History:  No past surgical history on file. Family History:  No family history on file. Social History:  Social History     Tobacco Use    Smoking status: Never    Smokeless tobacco: Never   Substance Use Topics    Alcohol use: Never    Drug use: Never       Medications:  No current facility-administered medications for this encounter. Current Outpatient Medications   Medication Sig Dispense Refill    fluticasone (FLONASE) 50 MCG/ACT nasal spray 1 spray by Each Nostril route daily 32 g 1    loratadine (CLARITIN) 10 MG tablet Take 1 tablet by mouth daily for 20 days 20 tablet 0    fluocinonide (LIDEX) 0.05 % cream Apply topically 2 times daily Apply topically 2 times daily. 30 g 0    clotrimazole (LOTRIMIN) 1 % cream Apply topically 2 times daily.  May take 2-3 weeks to resolve 85 g 1       Allergies:  No Known Allergies    Social Determinants of Health:  Social Determinants of Health     Tobacco Use: Low Risk  (2023)    Patient History     Smoking Tobacco Use: Never     Smokeless Tobacco Use: Never     Passive Exposure: Not on file   Alcohol Use: Not on

## 2023-10-10 NOTE — ED TRIAGE NOTES
Seen last week, diagnosed with a sinus infection and informed to take tylenol, no relief at home.  Pt states sinuses still feel stuffy, reports she is 6wks pregnant

## 2023-10-10 NOTE — ED NOTES
Patient given discharge papers. Patient understanding of discharge.  Patient ambulatory out of ED       Kamilah Schneider RN  10/10/23 9455

## 2023-11-28 ENCOUNTER — APPOINTMENT (OUTPATIENT)
Facility: HOSPITAL | Age: 23
End: 2023-11-28
Payer: OTHER GOVERNMENT

## 2023-11-28 ENCOUNTER — HOSPITAL ENCOUNTER (EMERGENCY)
Facility: HOSPITAL | Age: 23
Discharge: HOME | End: 2023-11-28
Payer: OTHER GOVERNMENT

## 2023-11-28 VITALS
OXYGEN SATURATION: 100 % | HEART RATE: 70 BPM | RESPIRATION RATE: 18 BRPM | BODY MASS INDEX: 19.73 KG/M2 | DIASTOLIC BLOOD PRESSURE: 64 MMHG | WEIGHT: 101 LBS | TEMPERATURE: 98.2 F | SYSTOLIC BLOOD PRESSURE: 109 MMHG

## 2023-11-28 DIAGNOSIS — J34.89 RHINORRHEA: ICD-10-CM

## 2023-11-28 DIAGNOSIS — R11.0 NAUSEA: ICD-10-CM

## 2023-11-28 DIAGNOSIS — H65.93 FLUID LEVEL BEHIND TYMPANIC MEMBRANE OF BOTH EARS: Primary | ICD-10-CM

## 2023-11-28 DIAGNOSIS — R10.9 ABDOMINAL PAIN IN PREGNANCY, FIRST TRIMESTER: ICD-10-CM

## 2023-11-28 DIAGNOSIS — O26.891 ABDOMINAL PAIN IN PREGNANCY, FIRST TRIMESTER: ICD-10-CM

## 2023-11-28 LAB
ALBUMIN SERPL-MCNC: 3.6 G/DL (ref 3.4–5)
ALBUMIN/GLOB SERPL: 0.9 (ref 0.8–1.7)
ALP SERPL-CCNC: 44 U/L (ref 45–117)
ALT SERPL-CCNC: 41 U/L (ref 13–56)
ANION GAP SERPL CALC-SCNC: 7 MMOL/L (ref 3–18)
APPEARANCE UR: CLEAR
AST SERPL-CCNC: 26 U/L (ref 10–38)
BASOPHILS # BLD: 0 K/UL (ref 0–0.1)
BASOPHILS NFR BLD: 0 % (ref 0–2)
BILIRUB SERPL-MCNC: 0.4 MG/DL (ref 0.2–1)
BILIRUB UR QL: NEGATIVE
BUN SERPL-MCNC: 9 MG/DL (ref 7–18)
BUN/CREAT SERPL: 12 (ref 12–20)
CALCIUM SERPL-MCNC: 8.7 MG/DL (ref 8.5–10.1)
CHLORIDE SERPL-SCNC: 109 MMOL/L (ref 100–111)
CO2 SERPL-SCNC: 24 MMOL/L (ref 21–32)
COLOR UR: YELLOW
CREAT SERPL-MCNC: 0.76 MG/DL (ref 0.6–1.3)
DIFFERENTIAL METHOD BLD: ABNORMAL
EOSINOPHIL # BLD: 0.2 K/UL (ref 0–0.4)
EOSINOPHIL NFR BLD: 2 % (ref 0–5)
ERYTHROCYTE [DISTWIDTH] IN BLOOD BY AUTOMATED COUNT: 14.9 % (ref 11.6–14.5)
FLUAV AG NPH QL IA: NEGATIVE
FLUBV AG NOSE QL IA: NEGATIVE
GLOBULIN SER CALC-MCNC: 3.9 G/DL (ref 2–4)
GLUCOSE SERPL-MCNC: 78 MG/DL (ref 74–99)
GLUCOSE UR STRIP.AUTO-MCNC: NEGATIVE MG/DL
HCG SERPL-ACNC: ABNORMAL MIU/ML (ref 0–10)
HCT VFR BLD AUTO: 37.9 % (ref 35–45)
HGB BLD-MCNC: 12.5 G/DL (ref 12–16)
HGB UR QL STRIP: NEGATIVE
IMM GRANULOCYTES # BLD AUTO: 0 K/UL (ref 0–0.04)
IMM GRANULOCYTES NFR BLD AUTO: 0 % (ref 0–0.5)
KETONES UR QL STRIP.AUTO: 40 MG/DL
LEUKOCYTE ESTERASE UR QL STRIP.AUTO: NEGATIVE
LIPASE SERPL-CCNC: 21 U/L (ref 13–75)
LYMPHOCYTES # BLD: 2 K/UL (ref 0.9–3.6)
LYMPHOCYTES NFR BLD: 21 % (ref 21–52)
MAGNESIUM SERPL-MCNC: 1.7 MG/DL (ref 1.6–2.6)
MCH RBC QN AUTO: 25.7 PG (ref 24–34)
MCHC RBC AUTO-ENTMCNC: 33 G/DL (ref 31–37)
MCV RBC AUTO: 77.8 FL (ref 78–100)
MONOCYTES # BLD: 0.4 K/UL (ref 0.05–1.2)
MONOCYTES NFR BLD: 4 % (ref 3–10)
NEUTS SEG # BLD: 6.6 K/UL (ref 1.8–8)
NEUTS SEG NFR BLD: 72 % (ref 40–73)
NITRITE UR QL STRIP.AUTO: NEGATIVE
NRBC # BLD: 0 K/UL (ref 0–0.01)
NRBC BLD-RTO: 0 PER 100 WBC
PH UR STRIP: 6.5 (ref 5–8)
PLATELET # BLD AUTO: 242 K/UL (ref 135–420)
PMV BLD AUTO: 10.8 FL (ref 9.2–11.8)
POTASSIUM SERPL-SCNC: 3.9 MMOL/L (ref 3.5–5.5)
PROT SERPL-MCNC: 7.5 G/DL (ref 6.4–8.2)
PROT UR STRIP-MCNC: NEGATIVE MG/DL
RBC # BLD AUTO: 4.87 M/UL (ref 4.2–5.3)
SARS-COV-2 RDRP RESP QL NAA+PROBE: NOT DETECTED
SERVICE CMNT-IMP: NORMAL
SODIUM SERPL-SCNC: 140 MMOL/L (ref 136–145)
SOURCE: NORMAL
SP GR UR REFRACTOMETRY: 1.02 (ref 1–1.03)
UROBILINOGEN UR QL STRIP.AUTO: 0.2 EU/DL (ref 0.2–1)
WBC # BLD AUTO: 9.2 K/UL (ref 4.6–13.2)
WET PREP GENITAL: NORMAL

## 2023-11-28 PROCEDURE — 2580000003 HC RX 258: Performed by: NURSE PRACTITIONER

## 2023-11-28 PROCEDURE — 96361 HYDRATE IV INFUSION ADD-ON: CPT

## 2023-11-28 PROCEDURE — 80053 COMPREHEN METABOLIC PANEL: CPT

## 2023-11-28 PROCEDURE — 96374 THER/PROPH/DIAG INJ IV PUSH: CPT

## 2023-11-28 PROCEDURE — 87635 SARS-COV-2 COVID-19 AMP PRB: CPT

## 2023-11-28 PROCEDURE — 83690 ASSAY OF LIPASE: CPT

## 2023-11-28 PROCEDURE — 6360000002 HC RX W HCPCS: Performed by: NURSE PRACTITIONER

## 2023-11-28 PROCEDURE — 83735 ASSAY OF MAGNESIUM: CPT

## 2023-11-28 PROCEDURE — 99284 EMERGENCY DEPT VISIT MOD MDM: CPT

## 2023-11-28 PROCEDURE — 84702 CHORIONIC GONADOTROPIN TEST: CPT

## 2023-11-28 PROCEDURE — 81003 URINALYSIS AUTO W/O SCOPE: CPT

## 2023-11-28 PROCEDURE — 87491 CHLMYD TRACH DNA AMP PROBE: CPT

## 2023-11-28 PROCEDURE — 85025 COMPLETE CBC W/AUTO DIFF WBC: CPT

## 2023-11-28 PROCEDURE — 76817 TRANSVAGINAL US OBSTETRIC: CPT

## 2023-11-28 PROCEDURE — 87591 N.GONORRHOEAE DNA AMP PROB: CPT

## 2023-11-28 PROCEDURE — 87661 TRICHOMONAS VAGINALIS AMPLIF: CPT

## 2023-11-28 PROCEDURE — 87804 INFLUENZA ASSAY W/OPTIC: CPT

## 2023-11-28 PROCEDURE — 87210 SMEAR WET MOUNT SALINE/INK: CPT

## 2023-11-28 PROCEDURE — 96375 TX/PRO/DX INJ NEW DRUG ADDON: CPT

## 2023-11-28 RX ORDER — METOCLOPRAMIDE HYDROCHLORIDE 5 MG/ML
10 INJECTION INTRAMUSCULAR; INTRAVENOUS
Status: COMPLETED | OUTPATIENT
Start: 2023-11-28 | End: 2023-11-28

## 2023-11-28 RX ORDER — DIPHENHYDRAMINE HYDROCHLORIDE 50 MG/ML
25 INJECTION INTRAMUSCULAR; INTRAVENOUS
Status: COMPLETED | OUTPATIENT
Start: 2023-11-28 | End: 2023-11-28

## 2023-11-28 RX ORDER — 0.9 % SODIUM CHLORIDE 0.9 %
1000 INTRAVENOUS SOLUTION INTRAVENOUS ONCE
Status: COMPLETED | OUTPATIENT
Start: 2023-11-28 | End: 2023-11-28

## 2023-11-28 RX ADMIN — METOCLOPRAMIDE 10 MG: 5 INJECTION, SOLUTION INTRAMUSCULAR; INTRAVENOUS at 09:26

## 2023-11-28 RX ADMIN — SODIUM CHLORIDE 1000 ML: 9 INJECTION, SOLUTION INTRAVENOUS at 09:26

## 2023-11-28 RX ADMIN — DIPHENHYDRAMINE HYDROCHLORIDE 25 MG: 50 INJECTION INTRAMUSCULAR; INTRAVENOUS at 09:26

## 2023-11-28 ASSESSMENT — PAIN SCALES - GENERAL: PAINLEVEL_OUTOF10: 7

## 2023-11-28 ASSESSMENT — PAIN - FUNCTIONAL ASSESSMENT: PAIN_FUNCTIONAL_ASSESSMENT: 0-10

## 2023-11-28 ASSESSMENT — PAIN DESCRIPTION - LOCATION: LOCATION: ABDOMEN

## 2023-11-28 NOTE — ED NOTES
Patient noted stable and presenting in no acute distress. All discharge instructions and medication list reviewed (as required) with the patient. All questions and concerns were addressed at this time, and the patient expresses understanding.  Patient released with vitals noted as recorded. Krish Gamboa RN  11/28/23 1257

## 2023-11-28 NOTE — ED NOTES
Pt unable to urinate at this time.       [facility], [address]  11/28/23 4017

## 2023-11-28 NOTE — ED NOTES
Received report from Jesús, [address]. All questions/ concerns addressed. Pt resting in position of comfort on cot presenting in no acute/ apparent distress (NAD). Respirations are noted even and unlabored with good rise and fall of the chest observed. Pt updated with current plan of care (POC) and all questions/ concerns addressed. Patient voices no needs at this time. Cot noted in lowest position, locked, with side rails X 2 up for patient safety. Will continue to monitor patient for acute changes.       [x] Side rails up    [x] Cart in lowest position    [] Family at bedside    [x] Call light within reach         Alli Diaz RN  11/28/23 9728

## 2023-11-28 NOTE — ED NOTES
PIV established. 22G Left AC. Blood collected and sent to the lab. Pt tolerated well. Will continue to monitor.       Loral Castleman, RN  11/28/23 5134

## 2023-11-28 NOTE — ED NOTES
The following labs were labeled with appropriate pt sticker and tubed to lab:     [] Blue     [] Lavender   [] on ice  [] Green/yellow  [] Green/black [] on ice  [] Cinderella Camel  [] on ice  [] Yellow  [] Red  [] Pink  [] Type/ Screen  [] ABG  [] VBG    [] COVID-19 swab    [] Rapid  [] PCR  [] Flu swab  [] Peds Viral Panel     [x] Urine Sample  [] Fecal Sample  [] Pelvic Cultures  [] Blood Cultures  [] X 2  [] STREP Cultures       Nayeli Varghese RN  11/28/23 0454

## 2023-11-28 NOTE — DISCHARGE INSTRUCTIONS
Claritin or Zyrtec OTC according to package directions  Increase fluid intake  May also use OTC intranasal steroid such as Flonase according to package directions  Occasional insufflation as discussed  Follow-up for new or worsening symptoms to include increasing pain, fever, dizziness or other concerning symptoms     Increase fluid intake, water and small frequent sips, may attempt to suck on ice  Try to eat small frequent meals  May try over-the-counter vitamin B6 and Unisom according to package directions  May try sucking on hard candies such as jelly ranchers   Follow-up with OB, call for appointment  Return to care for new or worsening symptoms to include worsening pain, intractable vomiting, vaginal bleeding or other concerning symptoms.

## 2023-11-28 NOTE — ED NOTES
Medication given per [facility] order. Education regarding medication provided. Tolerated well with no complaints. Instructed patient to utilize the call light if he/she needs anything further. Will continue to monitor.        Julio Cesar Daniel RN  11/28/23 8541

## 2023-11-28 NOTE — ED PROVIDER NOTES
[facility] EMERGENCY DEPT  EMERGENCY DEPARTMENT ENCOUNTER       Pt Name: Edelmira Thakur  MRN: [id number]  [address] [facility] 2000  Date of evaluation: 11/28/2023  PCP: None, None  Note Started: 2:10 PM 11/28/23     CHIEF COMPLAINT       Chief Complaint   Patient presents with    Nasal Congestion    Abdominal Pain        HISTORY OF PRESENT ILLNESS: 1 or more elements      History From: Patient  HPI Limitations: None  Chronic Conditions: anxiety, depression   Social Determinants affecting Dx or Tx: None     Edelmira Thakur is a 26 y.o. female (G1,P2, 13 weeks gestation) who presents to ED c/o a suspected sinus infection as well as lower abdominal pain. Patient reports headache, rhinorrhea and right-sided ear pressure x 5 to 6 days. Patient denies known exposure to illness, measured fever or chills, lightheadedness, vision changes, focal weakness or paresthesias, congestion, pharyngitis, cough, chest pain, shortness of breath, rash. Patient also notes intermittent sharp lower abdominal pain over the last 2 days that lasts for varying amounts of time but starts when she tried to stand up straight. Patient reports nausea and fatigue as well as urinary frequency that are at baseline for pregnancy. Patient denies vomiting, diarrhea, constipation, dysuria, hematuria, vaginal itching discharge or discomfort, vaginal bleeding. Patient is being seen by [facility] and has a previously confirmed IUP. Nursing Notes were all reviewed and agreed with or any disagreements were addressed in the HPI. PAST HISTORY     Past Medical History:  No past medical history on file. Past Surgical History:  No past surgical history on file. Family History:  No family history on file.     Social History:  Social History     Socioeconomic History    Marital status: Single   Tobacco Use    Smoking status: Never    Smokeless tobacco: Never   Substance and Sexual Activity    Alcohol use: Never    Drug use: Never       Allergies:  No Known

## 2023-11-29 LAB
C TRACH RRNA SPEC QL NAA+PROBE: NEGATIVE
N GONORRHOEA RRNA SPEC QL NAA+PROBE: NEGATIVE
SPECIMEN SOURCE: NORMAL
T VAGINALIS RRNA SPEC QL NAA+PROBE: NEGATIVE

## 2024-01-10 ENCOUNTER — HOSPITAL ENCOUNTER (EMERGENCY)
Facility: HOSPITAL | Age: 24
Discharge: HOME | End: 2024-01-10
Payer: OTHER GOVERNMENT

## 2024-01-10 VITALS
SYSTOLIC BLOOD PRESSURE: 108 MMHG | DIASTOLIC BLOOD PRESSURE: 62 MMHG | WEIGHT: 102 LBS | TEMPERATURE: 97.5 F | RESPIRATION RATE: 18 BRPM | HEIGHT: 60 IN | HEART RATE: 102 BPM | BODY MASS INDEX: 20.03 KG/M2 | OXYGEN SATURATION: 97 %

## 2024-01-10 DIAGNOSIS — L50.9 URTICARIA: Primary | ICD-10-CM

## 2024-01-10 PROCEDURE — 99282 EMERGENCY DEPT VISIT SF MDM: CPT

## 2024-01-10 ASSESSMENT — PAIN - FUNCTIONAL ASSESSMENT: PAIN_FUNCTIONAL_ASSESSMENT: NONE - DENIES PAIN

## 2024-01-11 NOTE — ED TRIAGE NOTES
Pt ambulatory to triage with coworker c/o rash on arms and stomach that happens after she showers or when sleeping x 5 days. Sometime the rash goes away. Pt is 5 months pregnant.

## 2024-01-12 NOTE — ED PROVIDER NOTES
[facility] EMERGENCY DEPT  EMERGENCY DEPARTMENT ENCOUNTER       Pt Name: Mónica Jeronimo  MRN: [id number]  Birthdate 2000  Date of evaluation: 1/10/2024  PCP: None, None  Note Started: 8:18 PM 1/11/24     CHIEF COMPLAINT       Chief Complaint   Patient presents with    Rash        HISTORY OF PRESENT ILLNESS: 1 or more elements      History From: Patient  HPI Limitations: None  Chronic Conditions: none  Social Determinants affecting Dx or Tx: none      Mónica Jeronimo is a 23 y.o. female who presents to ED c/o intermittent hives.  Patient states she is currently 20 weeks pregnant, uncomplicated pregnancy without any complaints related to this.  Over the last 5 days she has had intermittent red raised bumps on her arms stomach or back.  She states the hives are random and then resolve spontaneously, worse when she takes a hot shower and at night.  She denies any new soaps, lotions, determinants.  She actually does not have a rash or itching at all right now.  Patient states she showers twice daily she denies jaundice, abdominal pain and any other symptoms or complaints.     Nursing Notes were all reviewed and agreed with or any disagreements were addressed in the HPI.    PAST HISTORY     Past Medical History:  No past medical history on file.    Past Surgical History:  No past surgical history on file.    Family History:  No family history on file.    Social History:  Social History     Socioeconomic History    Marital status: Single   Tobacco Use    Smoking status: Never    Smokeless tobacco: Never   Substance and Sexual Activity    Alcohol use: Never    Drug use: Never       Allergies:  No Known Allergies    CURRENT MEDICATIONS      No current facility-administered medications for this encounter.     Current Outpatient Medications   Medication Sig Dispense Refill    fluticasone (FLONASE) 50 MCG/ACT nasal spray 1 spray by Each Nostril route daily 32 g 1    fluocinonide (LIDEX) 0.05 % cream Apply topically 2 times daily Apply

## 2024-04-02 ENCOUNTER — HOSPITAL ENCOUNTER (EMERGENCY)
Facility: HOSPITAL | Age: 24
Discharge: HOME | End: 2024-04-02
Payer: OTHER GOVERNMENT

## 2024-04-02 VITALS
DIASTOLIC BLOOD PRESSURE: 64 MMHG | WEIGHT: 111 LBS | RESPIRATION RATE: 14 BRPM | HEART RATE: 80 BPM | TEMPERATURE: 97 F | SYSTOLIC BLOOD PRESSURE: 108 MMHG | BODY MASS INDEX: 21.79 KG/M2 | HEIGHT: 60 IN | OXYGEN SATURATION: 99 %

## 2024-04-02 DIAGNOSIS — H10.31 ACUTE BACTERIAL CONJUNCTIVITIS OF RIGHT EYE: Primary | ICD-10-CM

## 2024-04-02 PROCEDURE — 99283 EMERGENCY DEPT VISIT LOW MDM: CPT

## 2024-04-02 RX ORDER — ERYTHROMYCIN 5 MG/G
OINTMENT OPHTHALMIC
Qty: 1 G | Refills: 0 | Status: SHIPPED | OUTPATIENT
Start: 2024-04-02

## 2024-04-02 ASSESSMENT — VISUAL ACUITY: OU: 1

## 2024-04-02 NOTE — ED PROVIDER NOTES
[facility] EMERGENCY DEPT  EMERGENCY DEPARTMENT ENCOUNTER       Pt Name: Mónica Jeronimo  MRN: [id number]  Birthdate 2000  Date of evaluation: 4/2/2024  Provider: Bessie Freeman PA-C   PCP: None, None  Note Started: 9:45 AM 4/2/24     CHIEF COMPLAINT       Chief Complaint   Patient presents with    Conjunctivitis        HISTORY OF PRESENT ILLNESS: 1 or more elements      History From: Patient  HPI Limitations: none     Mónica Jeronimo is a 23 y.o. female who presents to the emergency department with a chief complaint of right eye redness and itching onset this morning when she woke up.  Patient wears glasses only.  She denies any injury to the eye.  She denies any recent illness, fever, foreign body sensation, eye pain, or visual disturbance.  She is currently 31 weeks pregnant.     Nursing Notes were all reviewed and agreed with or any disagreements were addressed in the HPI.    PAST HISTORY     Past Medical History:  No past medical history on file.    Past Surgical History:  No past surgical history on file.    Family History:  No family history on file.    Social History:  Social History     Socioeconomic History    Marital status:    Tobacco Use    Smoking status: Never    Smokeless tobacco: Never   Substance and Sexual Activity    Alcohol use: Never    Drug use: Never       Allergies:  No Known Allergies    CURRENT MEDICATIONS      No current facility-administered medications for this encounter.     Current Outpatient Medications   Medication Sig Dispense Refill    erythromycin (ROMYCIN) 5 MG/GM ophthalmic ointment Apply 1/2 inch of ointment to right inner eyelid twice daily for 7 days 1 g 0    fluticasone (FLONASE) 50 MCG/ACT nasal spray 1 spray by Each Nostril route daily 32 g 1    fluocinonide (LIDEX) 0.05 % cream Apply topically 2 times daily Apply topically 2 times daily. 30 g 0    clotrimazole (LOTRIMIN) 1 % cream Apply topically 2 times daily. May take 2-3 weeks to resolve 85 g 1          PHYSICAL EXAM

## 2024-04-02 NOTE — DISCHARGE INSTRUCTIONS
Practice good hand hygiene, wash all wash clothes and pillowcases, avoid contact lens use for the next 7 days.  If you develop any worsening symptoms, return to the emergency department for evaluation.  Follow-up with eye doctor

## 2024-04-22 ENCOUNTER — HOSPITAL ENCOUNTER (OUTPATIENT)
Facility: HOSPITAL | Age: 24
Discharge: HOME | End: 2024-04-22
Attending: OBSTETRICS & GYNECOLOGY | Admitting: OBSTETRICS & GYNECOLOGY
Payer: OTHER GOVERNMENT

## 2024-04-22 VITALS
SYSTOLIC BLOOD PRESSURE: 108 MMHG | TEMPERATURE: 97.8 F | HEART RATE: 92 BPM | DIASTOLIC BLOOD PRESSURE: 68 MMHG | OXYGEN SATURATION: 100 %

## 2024-04-22 PROBLEM — Z33.1 IUP (INTRAUTERINE PREGNANCY), INCIDENTAL: Status: ACTIVE | Noted: 2024-04-22

## 2024-04-22 PROCEDURE — 81003 URINALYSIS AUTO W/O SCOPE: CPT

## 2024-04-22 PROCEDURE — 99282 EMERGENCY DEPT VISIT SF MDM: CPT

## 2024-04-22 PROCEDURE — 59025 FETAL NON-STRESS TEST: CPT

## 2024-04-22 RX ORDER — FERROUS SULFATE 325(65) MG
325 TABLET ORAL
COMMUNITY

## 2024-04-22 NOTE — PROGRESS NOTES
Received to triage with c/o contractions every 10 minutes. States she receive her prenatal care with [facility]. Assisted to BR for urine sample and to change into a gown.

## 2024-04-23 NOTE — PROGRESS NOTES
2016 SEEMA Osorio CNM called and informed of pt's arrival and c/o, UA results, Reactive NST and 2 inverted contractions noted. Orders for discharge with labor precautions received.      2023  Written and verbal DC instructions given to include daily fetal kick counts, labor precautions. Verbalizes understanding. All questions answered. Up to get dressed.     2027 Dc'd to home ambulatory with her friend.

## 2024-04-23 NOTE — DISCHARGE INSTRUCTIONS
These include shark, swordfish, lorri mackerel, marlin, orange roughy, and bigeye tuna, as well as tilefish from the [place] [facility].  If you smoke or vape, quit or cut back as much as you can. Talk to your doctor if you need help quitting.  If you use alcohol, marijuana, or other drugs, quit or cut back as much as you can. It's safest not to use them at all. Talk to your doctor if you need help quitting.  Follow your doctor's directions about activity. Your doctor will let you know how much exercise you can do.  Ask your doctor if you can have sex. If you are at risk for early labor, your doctor may ask you to not have sex.  Take care to avoid falling. Changes in your body during pregnancy, such as a growing belly, can make you more likely to fall. Sports such as bicycling, skiing, or in-line skating can increase your risk.  Avoid risky activities like horseback or motorcycle riding, water-skiing, scuba diving, and exercising at a high altitude (above 6,000 feet). If you live in a place with a high altitude, talk to your doctor about how you can exercise safely.  Avoid things that can make your body too hot and may be harmful to your pregnancy, such as a hot tub or sauna. Or talk with your doctor before doing anything that raises your body temperature. Your doctor can tell you if it's safe.  Do not take any over-the-counter or herbal medicines or supplements without talking to your doctor or pharmacist first.  When should you call for help?   Call 911  anytime you think you may need emergency care. For example, call if:    You passed out (lost consciousness).     You have a seizure.     You have severe vaginal bleeding. This means that you are soaking through your usual pads or tampons every hour for 2 or more hours.     You have severe pain in your belly or pelvis.     You have had fluid gushing or leaking from your vagina and you know or think the umbilical cord is bulging into your vagina. If this happens,

## 2024-04-24 LAB
APPEARANCE UR: CLEAR
BILIRUB UR QL: NEGATIVE
COLOR UR: YELLOW
GLUCOSE UR QL STRIP.AUTO: NEGATIVE MG/DL
KETONES UR-MCNC: NEGATIVE MG/DL
LEUKOCYTE ESTERASE UR QL STRIP: NEGATIVE
NITRITE UR QL: NEGATIVE
PH UR: 7.5 (ref 5–9)
PROT UR QL: ABNORMAL MG/DL
RBC # UR STRIP: NEGATIVE
SERVICE CMNT-IMP: ABNORMAL
SP GR UR: 1.02 (ref 1–1.02)
UROBILINOGEN UR QL: 0.2 EU/DL (ref 0.2–1)

## 2024-12-12 ENCOUNTER — APPOINTMENT (OUTPATIENT)
Facility: HOSPITAL | Age: 24
End: 2024-12-12
Payer: OTHER GOVERNMENT

## 2024-12-12 ENCOUNTER — HOSPITAL ENCOUNTER (EMERGENCY)
Facility: HOSPITAL | Age: 24
Discharge: HOME | End: 2024-12-12
Attending: EMERGENCY MEDICINE
Payer: OTHER GOVERNMENT

## 2024-12-12 VITALS
SYSTOLIC BLOOD PRESSURE: 113 MMHG | HEIGHT: 60 IN | OXYGEN SATURATION: 99 % | BODY MASS INDEX: 21.6 KG/M2 | WEIGHT: 110 LBS | DIASTOLIC BLOOD PRESSURE: 70 MMHG | HEART RATE: 76 BPM | RESPIRATION RATE: 15 BRPM | TEMPERATURE: 97.5 F

## 2024-12-12 DIAGNOSIS — S06.0XAA CONCUSSION WITH UNKNOWN LOSS OF CONSCIOUSNESS STATUS, INITIAL ENCOUNTER: ICD-10-CM

## 2024-12-12 DIAGNOSIS — S09.90XA CLOSED HEAD INJURY, INITIAL ENCOUNTER: Primary | ICD-10-CM

## 2024-12-12 PROCEDURE — 70450 CT HEAD/BRAIN W/O DYE: CPT

## 2024-12-12 PROCEDURE — 99284 EMERGENCY DEPT VISIT MOD MDM: CPT

## 2024-12-12 PROCEDURE — 72125 CT NECK SPINE W/O DYE: CPT

## 2024-12-12 ASSESSMENT — PAIN DESCRIPTION - LOCATION: LOCATION: HEAD

## 2024-12-12 ASSESSMENT — PAIN - FUNCTIONAL ASSESSMENT: PAIN_FUNCTIONAL_ASSESSMENT: 0-10

## 2024-12-12 ASSESSMENT — PAIN DESCRIPTION - DESCRIPTORS: DESCRIPTORS: DISCOMFORT

## 2024-12-12 ASSESSMENT — PAIN SCALES - GENERAL: PAINLEVEL_OUTOF10: 9

## 2024-12-12 NOTE — ED PROVIDER NOTES
appointment, should the condition change.     CLINICAL IMPRESSION:  1. Closed head injury, initial encounter    2. Concussion with unknown loss of consciousness status, initial encounter        PLAN:  D/C Home    DISCHARGE MEDICATIONS:  Discharge Medication List as of 12/12/2024  5:04 AM             Details   Prenatal Vit-Fe Fumarate-FA (PRENATAL PO) Take by mouthHistorical Med      ferrous sulfate (IRON 325) 325 (65 Fe) MG tablet Take 1 tablet by mouth daily (with breakfast)Historical Med      erythromycin (ROMYCIN) 5 MG/GM ophthalmic ointment Apply 1/2 inch of ointment to right inner eyelid twice daily for 7 days, Disp-1 g, R-0, Normal      fluticasone (FLONASE) 50 MCG/ACT nasal spray 1 spray by Each Nostril route daily, Disp-32 g, R-1Normal      fluocinonide (LIDEX) 0.05 % cream Apply topically 2 times daily Apply topically 2 times daily., Topical, 2 TIMES DAILY Starting Sat 7/15/2023, Disp-30 g, R-0, Normal      clotrimazole (LOTRIMIN) 1 % cream Apply topically 2 times daily. May take 2-3 weeks to resolve, Disp-85 g, R-1, Normal             DISCONTINUED MEDICATIONS:  Discharge Medication List as of 12/12/2024  5:04 AM          PATIENT REFERRED TO:  Follow Up with:  PCP    Schedule an appointment as soon as possible for a visit       [facility] EMERGENCY DEPT  [address] [facility] [id number] [phone]  Go to   If symptoms worsen      Dragon Disclaimer     Please note that this dictation was completed with [employer], the computer voice recognition software. Quite often unanticipated grammatical, syntax, homophones, and other interpretive errors are inadvertently transcribed by the computer software. Please disregard these errors. Please excuse any errors that have escaped final proofreading.      I Ronald Hernández MD am the primary clinician of record.  Ronald Hernández MD  (Electronically signed)           Ronald Hernández MD  12/12/24 0624

## 2024-12-12 NOTE — ED NOTES
Paperwork read with patient .    IV taken out.    Armband removed and disposed of in shredder.     Patient has no further questions at this time.     Will discharge from system.

## 2024-12-12 NOTE — ED TRIAGE NOTES
Pt reports falling and hitting her head x2 days ago. Pt reports pain to the top of her head and neck. Pt does not remember how she hit her head or how she fell, reports she woke up on the ground. Pt is A&Ox4.

## 2025-01-05 ENCOUNTER — HOSPITAL ENCOUNTER (EMERGENCY)
Facility: HOSPITAL | Age: 25
Discharge: HOME | End: 2025-01-06
Payer: OTHER GOVERNMENT

## 2025-01-05 VITALS
HEIGHT: 60 IN | WEIGHT: 110 LBS | BODY MASS INDEX: 21.6 KG/M2 | RESPIRATION RATE: 16 BRPM | SYSTOLIC BLOOD PRESSURE: 118 MMHG | OXYGEN SATURATION: 100 % | HEART RATE: 90 BPM | DIASTOLIC BLOOD PRESSURE: 76 MMHG | TEMPERATURE: 98.2 F

## 2025-01-05 DIAGNOSIS — R11.0 NAUSEA: ICD-10-CM

## 2025-01-05 DIAGNOSIS — O21.9 VOMITING OF PREGNANCY, ANTEPARTUM: Primary | ICD-10-CM

## 2025-01-05 LAB
APPEARANCE UR: CLEAR
BASOPHILS # BLD: 0 K/UL (ref 0–0.1)
BASOPHILS NFR BLD: 0 % (ref 0–2)
BILIRUB UR QL: NEGATIVE
COLOR UR: YELLOW
DIFFERENTIAL METHOD BLD: ABNORMAL
EOSINOPHIL # BLD: 0.1 K/UL (ref 0–0.4)
EOSINOPHIL NFR BLD: 2 % (ref 0–5)
ERYTHROCYTE [DISTWIDTH] IN BLOOD BY AUTOMATED COUNT: 19.5 % (ref 11.6–14.5)
GLUCOSE UR STRIP.AUTO-MCNC: NEGATIVE MG/DL
HCG UR QL: POSITIVE
HCT VFR BLD AUTO: 41.5 % (ref 35–45)
HGB BLD-MCNC: 13.7 G/DL (ref 12–16)
HGB UR QL STRIP: NEGATIVE
IMM GRANULOCYTES # BLD AUTO: 0 K/UL (ref 0–0.04)
IMM GRANULOCYTES NFR BLD AUTO: 0 % (ref 0–0.5)
KETONES UR QL STRIP.AUTO: ABNORMAL MG/DL
LEUKOCYTE ESTERASE UR QL STRIP.AUTO: NEGATIVE
LYMPHOCYTES # BLD: 2.4 K/UL (ref 0.9–3.6)
LYMPHOCYTES NFR BLD: 27 % (ref 21–52)
MCH RBC QN AUTO: 24.7 PG (ref 24–34)
MCHC RBC AUTO-ENTMCNC: 33 G/DL (ref 31–37)
MCV RBC AUTO: 74.8 FL (ref 78–100)
MONOCYTES # BLD: 0.6 K/UL (ref 0.05–1.2)
MONOCYTES NFR BLD: 7 % (ref 3–10)
NEUTS SEG # BLD: 5.6 K/UL (ref 1.8–8)
NEUTS SEG NFR BLD: 64 % (ref 40–73)
NITRITE UR QL STRIP.AUTO: NEGATIVE
NRBC # BLD: 0 K/UL (ref 0–0.01)
NRBC BLD-RTO: 0 PER 100 WBC
PH UR STRIP: 6.5 (ref 5–8)
PLATELET # BLD AUTO: 299 K/UL (ref 135–420)
PMV BLD AUTO: 10.7 FL (ref 9.2–11.8)
PROT UR STRIP-MCNC: NEGATIVE MG/DL
RBC # BLD AUTO: 5.55 M/UL (ref 4.2–5.3)
SP GR UR REFRACTOMETRY: 1.02 (ref 1–1.03)
UROBILINOGEN UR QL STRIP.AUTO: 1 EU/DL (ref 0.2–1)
WBC # BLD AUTO: 8.8 K/UL (ref 4.6–13.2)

## 2025-01-05 PROCEDURE — 99284 EMERGENCY DEPT VISIT MOD MDM: CPT

## 2025-01-05 PROCEDURE — 96361 HYDRATE IV INFUSION ADD-ON: CPT

## 2025-01-05 PROCEDURE — 80053 COMPREHEN METABOLIC PANEL: CPT

## 2025-01-05 PROCEDURE — 84702 CHORIONIC GONADOTROPIN TEST: CPT

## 2025-01-05 PROCEDURE — 6360000002 HC RX W HCPCS

## 2025-01-05 PROCEDURE — 85025 COMPLETE CBC W/AUTO DIFF WBC: CPT

## 2025-01-05 PROCEDURE — 81025 URINE PREGNANCY TEST: CPT

## 2025-01-05 PROCEDURE — 81003 URINALYSIS AUTO W/O SCOPE: CPT

## 2025-01-05 PROCEDURE — 96374 THER/PROPH/DIAG INJ IV PUSH: CPT

## 2025-01-05 PROCEDURE — 2580000003 HC RX 258

## 2025-01-05 RX ORDER — 0.9 % SODIUM CHLORIDE 0.9 %
1000 INTRAVENOUS SOLUTION INTRAVENOUS ONCE
Status: COMPLETED | OUTPATIENT
Start: 2025-01-05 | End: 2025-01-05

## 2025-01-05 RX ORDER — ONDANSETRON 2 MG/ML
4 INJECTION INTRAMUSCULAR; INTRAVENOUS
Status: COMPLETED | OUTPATIENT
Start: 2025-01-05 | End: 2025-01-05

## 2025-01-05 RX ADMIN — ONDANSETRON 4 MG: 2 INJECTION INTRAMUSCULAR; INTRAVENOUS at 23:07

## 2025-01-05 RX ADMIN — SODIUM CHLORIDE 1000 ML: 9 INJECTION, SOLUTION INTRAVENOUS at 22:38

## 2025-01-05 ASSESSMENT — PAIN - FUNCTIONAL ASSESSMENT: PAIN_FUNCTIONAL_ASSESSMENT: 0-10

## 2025-01-05 ASSESSMENT — PAIN SCALES - GENERAL: PAINLEVEL_OUTOF10: 8

## 2025-01-06 LAB
ALBUMIN SERPL-MCNC: 4.2 G/DL (ref 3.4–5)
ALBUMIN/GLOB SERPL: 1.2 (ref 0.8–1.7)
ALP SERPL-CCNC: 73 U/L (ref 45–117)
ALT SERPL-CCNC: 22 U/L (ref 13–56)
ANION GAP SERPL CALC-SCNC: 7 MMOL/L (ref 3–18)
AST SERPL-CCNC: 15 U/L (ref 10–38)
BILIRUB SERPL-MCNC: 0.4 MG/DL (ref 0.2–1)
BUN SERPL-MCNC: 11 MG/DL (ref 7–18)
BUN/CREAT SERPL: 13 (ref 12–20)
CALCIUM SERPL-MCNC: 8.7 MG/DL (ref 8.5–10.1)
CHLORIDE SERPL-SCNC: 106 MMOL/L (ref 100–111)
CO2 SERPL-SCNC: 24 MMOL/L (ref 21–32)
CREAT SERPL-MCNC: 0.85 MG/DL (ref 0.6–1.3)
GLOBULIN SER CALC-MCNC: 3.6 G/DL (ref 2–4)
GLUCOSE SERPL-MCNC: 81 MG/DL (ref 74–99)
HCG SERPL-ACNC: 6112 MIU/ML (ref 0–10)
POTASSIUM SERPL-SCNC: 3.5 MMOL/L (ref 3.5–5.5)
PROT SERPL-MCNC: 7.8 G/DL (ref 6.4–8.2)
SODIUM SERPL-SCNC: 137 MMOL/L (ref 136–145)

## 2025-01-06 RX ORDER — ONDANSETRON 4 MG/1
4 TABLET, FILM COATED ORAL 3 TIMES DAILY PRN
Qty: 12 TABLET | Refills: 0 | Status: SHIPPED | OUTPATIENT
Start: 2025-01-06

## 2025-01-06 NOTE — ED PROVIDER NOTES
[facility] EMERGENCY DEPT  EMERGENCY DEPARTMENT ENCOUNTER       Pt Name: Mónica Jeronimo  MRN: [id number]  Birthdate 2000  Date of evaluation: 2025  PCP: None, None  Note Started: 12:33 AM 25     CHIEF COMPLAINT       Chief Complaint   Patient presents with    Abdominal Pain    Nausea        HISTORY OF PRESENT ILLNESS: 1 or more elements      History From: Patient  HPI Limitations: None  Chronic Conditions: none   Social Determinants affecting Dx or Tx: none    Mónica Jeronimo is a 24 y.o. female who presents to ED c/o hip pain and nausea.  Patient states that she is  pregnant and that her LMP was 24. States has been experiencing some pain on the right and left side of her hips x 1-1/2 weeks along with vomiting.  Patient states that she is unable to keep food down and everything that she eats or drinks comes right back up.  States that she feels extremely nauseous and has no appetite.  Patient states that she has also noticed some spotting for the last day but states that it is barely noticeable on her panties. Patient states that this is her second pregnancy.  States that the pregnancy before this was a normal delivery via  without complications.  Patient denies chronic medical history.  Denies chest pain, shortness of breath, abdominal pain, back pain, headache, diarrhea.      Nursing Notes were all reviewed and agreed with or any disagreements were addressed in the HPI.    PAST HISTORY     Past Medical History:  Past Medical History:   Diagnosis Date    Anemia        Past Surgical History:  No past surgical history on file.    Family History:  No family history on file.    Social History:  Social History     Socioeconomic History    Marital status:    Tobacco Use    Smoking status: Never    Smokeless tobacco: Never   Substance and Sexual Activity    Alcohol use: Never    Drug use: Never     Social Determinants of Health     Food Insecurity: Unknown (2024)    Received from [facility]

## 2025-01-06 NOTE — ED TRIAGE NOTES
Pt ambulatory to ed with complaints nausea and abdominal pain x2 weeks. Pt states that she had a positive home pregnancy test 2 weeks ago. Pt denies vomitting, diarrhea, fever/chills.

## 2025-01-15 ENCOUNTER — HOSPITAL ENCOUNTER (EMERGENCY)
Facility: HOSPITAL | Age: 25
Discharge: HOME | End: 2025-01-18
Payer: OTHER GOVERNMENT

## 2025-01-15 ENCOUNTER — HOSPITAL ENCOUNTER (EMERGENCY)
Facility: HOSPITAL | Age: 25
Discharge: HOME | End: 2025-01-15
Payer: OTHER GOVERNMENT

## 2025-01-15 VITALS
SYSTOLIC BLOOD PRESSURE: 131 MMHG | OXYGEN SATURATION: 100 % | DIASTOLIC BLOOD PRESSURE: 79 MMHG | HEART RATE: 79 BPM | HEIGHT: 60 IN | RESPIRATION RATE: 12 BRPM | WEIGHT: 110 LBS | TEMPERATURE: 97.9 F | BODY MASS INDEX: 21.6 KG/M2

## 2025-01-15 DIAGNOSIS — Z3A.01 6 WEEKS GESTATION OF PREGNANCY: ICD-10-CM

## 2025-01-15 DIAGNOSIS — O20.0 THREATENED MISCARRIAGE IN EARLY PREGNANCY: Primary | ICD-10-CM

## 2025-01-15 LAB
ALBUMIN SERPL-MCNC: 4.1 G/DL (ref 3.4–5)
ALBUMIN/GLOB SERPL: 1.1 (ref 0.8–1.7)
ALP SERPL-CCNC: 62 U/L (ref 45–117)
ALT SERPL-CCNC: 17 U/L (ref 13–56)
ANION GAP SERPL CALC-SCNC: 6 MMOL/L (ref 3–18)
AST SERPL-CCNC: 9 U/L (ref 10–38)
BILIRUB SERPL-MCNC: 0.4 MG/DL (ref 0.2–1)
BUN SERPL-MCNC: 9 MG/DL (ref 7–18)
BUN/CREAT SERPL: 13 (ref 12–20)
CALCIUM SERPL-MCNC: 8.9 MG/DL (ref 8.5–10.1)
CHLORIDE SERPL-SCNC: 107 MMOL/L (ref 100–111)
CO2 SERPL-SCNC: 23 MMOL/L (ref 21–32)
CREAT SERPL-MCNC: 0.72 MG/DL (ref 0.6–1.3)
ERYTHROCYTE [DISTWIDTH] IN BLOOD BY AUTOMATED COUNT: 17.9 % (ref 11.6–14.5)
GLOBULIN SER CALC-MCNC: 3.6 G/DL (ref 2–4)
GLUCOSE SERPL-MCNC: 86 MG/DL (ref 74–99)
HCG SERPL-ACNC: ABNORMAL MIU/ML (ref 0–10)
HCT VFR BLD AUTO: 41.1 % (ref 35–45)
HGB BLD-MCNC: 14 G/DL (ref 12–16)
MCH RBC QN AUTO: 25.7 PG (ref 24–34)
MCHC RBC AUTO-ENTMCNC: 34.1 G/DL (ref 31–37)
MCV RBC AUTO: 75.6 FL (ref 78–100)
NRBC # BLD: 0 K/UL (ref 0–0.01)
NRBC BLD-RTO: 0 PER 100 WBC
PLATELET # BLD AUTO: 260 K/UL (ref 135–420)
PMV BLD AUTO: 10.3 FL (ref 9.2–11.8)
POTASSIUM SERPL-SCNC: 3.3 MMOL/L (ref 3.5–5.5)
PROT SERPL-MCNC: 7.7 G/DL (ref 6.4–8.2)
RBC # BLD AUTO: 5.44 M/UL (ref 4.2–5.3)
SODIUM SERPL-SCNC: 136 MMOL/L (ref 136–145)
WBC # BLD AUTO: 8.2 K/UL (ref 4.6–13.2)

## 2025-01-15 PROCEDURE — 99284 EMERGENCY DEPT VISIT MOD MDM: CPT

## 2025-01-15 PROCEDURE — 93975 VASCULAR STUDY: CPT

## 2025-01-15 PROCEDURE — 84702 CHORIONIC GONADOTROPIN TEST: CPT

## 2025-01-15 PROCEDURE — 80053 COMPREHEN METABOLIC PANEL: CPT

## 2025-01-15 PROCEDURE — 85027 COMPLETE CBC AUTOMATED: CPT

## 2025-01-15 ASSESSMENT — PAIN - FUNCTIONAL ASSESSMENT: PAIN_FUNCTIONAL_ASSESSMENT: NONE - DENIES PAIN

## 2025-01-15 NOTE — ED TRIAGE NOTES
Patient reports she just found out she was pregnant. States now she has been bleeding for about a week.

## 2025-01-15 NOTE — ED PROVIDER NOTES
[facility] EMERGENCY DEPARTMENT  EMERGENCY DEPARTMENT ENCOUNTER       Pt Name: Mónica Jeronimo  MRN: [id number]  Birthdate 2000  Date of evaluation: 1/15/2025  PCP: None, None  Note Started: 6:28 PM 1/15/25     CHIEF COMPLAINT       Chief Complaint   Patient presents with    Vaginal Bleeding        HISTORY OF PRESENT ILLNESS: 1 or more elements      History From: Patient  HPI Limitations: None  Chronic Conditions: anemia  Social Determinants affecting Dx or Tx: none      Mónica Jeronimo is a 24 y.o. female who presents to ED c/o light vaginal bleeding described as spotting on and off for the last week, had a little more bleeding today prompting her to come to the ED.  Patient was seen in this ED 10 days ago for nausea and light bleeding, quant was around 6000.  She has not yet had ultrasound nor does she have an OB/GYN.  She is Rh+.  She denies severe abdominal pain, fever, heavy bleeding, dysuria and any other symptoms or complaints.     Nursing Notes were all reviewed and agreed with or any disagreements were addressed in the HPI.    PAST HISTORY     Past Medical History:  Past Medical History:   Diagnosis Date    Anemia        Past Surgical History:  History reviewed. No pertinent surgical history.    Family History:  History reviewed. No pertinent family history.    Social History:  Social History     Socioeconomic History    Marital status:      Spouse name: None    Number of children: None    Years of education: None    Highest education level: None   Tobacco Use    Smoking status: Never    Smokeless tobacco: Never   Substance and Sexual Activity    Alcohol use: Never    Drug use: Never     Social Determinants of Health     Food Insecurity: Unknown (5/24/2024)    Received from [facility]    Hunger Vital Sign     Worried About Running Out of Food in the Last Year: Never true   Transportation Needs: Unknown (5/24/2024)    Received from [facility]    PRAPARE - Transportation